# Patient Record
Sex: FEMALE | Race: WHITE | NOT HISPANIC OR LATINO | Employment: STUDENT | URBAN - METROPOLITAN AREA
[De-identification: names, ages, dates, MRNs, and addresses within clinical notes are randomized per-mention and may not be internally consistent; named-entity substitution may affect disease eponyms.]

---
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
## 2023-03-15 DIAGNOSIS — L70.0 ACNE VULGARIS: ICD-10-CM

## 2023-03-22 RX ORDER — NORGESTIMATE AND ETHINYL ESTRADIOL 0.25-0.035
KIT ORAL
Qty: 84 TABLET | Refills: 4 | Status: SHIPPED | OUTPATIENT
Start: 2023-03-22

## 2023-07-12 DIAGNOSIS — L70.0 ACNE VULGARIS: ICD-10-CM

## 2023-07-12 RX ORDER — NORGESTIMATE AND ETHINYL ESTRADIOL 0.25-0.035
KIT ORAL
Qty: 84 TABLET | Refills: 4 | Status: SHIPPED | OUTPATIENT
Start: 2023-07-12

## 2023-08-07 ENCOUNTER — OFFICE VISIT (OUTPATIENT)
Dept: OBGYN CLINIC | Facility: CLINIC | Age: 18
End: 2023-08-07
Payer: COMMERCIAL

## 2023-08-07 VITALS — BODY MASS INDEX: 18.97 KG/M2 | DIASTOLIC BLOOD PRESSURE: 72 MMHG | WEIGHT: 114 LBS | SYSTOLIC BLOOD PRESSURE: 122 MMHG

## 2023-08-07 DIAGNOSIS — Z30.41 ENCOUNTER FOR SURVEILLANCE OF CONTRACEPTIVE PILLS: ICD-10-CM

## 2023-08-07 DIAGNOSIS — L70.0 ACNE VULGARIS: ICD-10-CM

## 2023-08-07 DIAGNOSIS — Z01.419 ENCOUNTER FOR GYNECOLOGICAL EXAMINATION (GENERAL) (ROUTINE) WITHOUT ABNORMAL FINDINGS: Primary | ICD-10-CM

## 2023-08-07 PROCEDURE — S0610 ANNUAL GYNECOLOGICAL EXAMINA: HCPCS | Performed by: STUDENT IN AN ORGANIZED HEALTH CARE EDUCATION/TRAINING PROGRAM

## 2023-08-07 RX ORDER — DESOGESTREL AND ETHINYL ESTRADIOL 0.15-0.03
1 KIT ORAL DAILY
Qty: 84 TABLET | Refills: 4 | Status: SHIPPED | OUTPATIENT
Start: 2023-08-07

## 2023-08-07 RX ORDER — NORGESTIMATE AND ETHINYL ESTRADIOL 0.25-0.035
1 KIT ORAL DAILY
Qty: 84 TABLET | Refills: 4 | Status: SHIPPED | OUTPATIENT
Start: 2023-08-07 | End: 2023-08-07

## 2023-08-07 NOTE — PROGRESS NOTES
Tristen Downing  2005    Assessment and Plan:  Yearly exam without abnormality.     -Pap next annual. We reviewed ASCCP guidelines for Pap testing today. -Recommend STI testing for both herself and her partner. She will do this as soon as she is back at school. Long term sequelae of STIs reviewed today  -Switch to Apri for OCPs, given inadequate treatment of acne with Orthocyclen. Safe and effective use reviewed    RTO one year for yearly exam or sooner as needed. CC:  Yearly exam    S:  19 y.o. female here for yearly exam.     Menarche: 13years old. G0  LMP 7/18/23  Contraception: OCP   - was initially started for acne, but her acne is not actually improved   History of chlamydia, asymptomatic. She and partner were both treated, was not tested for reinfection    No smoking, alcohol, drugs   Exercises irregularly    Her cycles are regular, not heavy or crampy. Sexual activity: She is sexually active without pain, bleeding or dryness. STD testing:  She does not want STD testing today. Does not want this to be seen on her parents' insurance claim. Gardasil:  She has had the Gardasil series. Family hx of breast cancer: denies  Family hx of ovarian cancer: denies  Family hx of colon cancer: denies     Denies hot flushes, dyspareunia, abnormal uterine bleeding, urinary/fecal incontinence, changes in energy levels, mood. Current Outpatient Medications:   •  desogestrel-ethinyl estradiol (APRI) 0.15-30 MG-MCG per tablet, Take 1 tablet by mouth daily, Disp: 84 tablet, Rfl: 4  •  brompheniramine-pseudoephedrine-DM 30-2-10 MG/5ML syrup, Take 10 mL by mouth 4 (four) times a day as needed for congestion or cough (Patient not taking: Reported on 10/3/2022), Disp: 120 mL, Rfl: 0  •  fluticasone (FLONASE) 50 mcg/act nasal spray, 1 spray into each nostril daily.  (Patient not taking: Reported on 8/16/2022), Disp: , Rfl:   Social History     Socioeconomic History   • Marital status: Single Spouse name: Not on file   • Number of children: Not on file   • Years of education: Not on file   • Highest education level: Not on file   Occupational History   • Not on file   Tobacco Use   • Smoking status: Never   • Smokeless tobacco: Never   • Tobacco comments:     No exposure   Vaping Use   • Vaping Use: Never used   Substance and Sexual Activity   • Alcohol use: Not Currently   • Drug use: Never   • Sexual activity: Yes     Partners: Male     Birth control/protection: OCP   Other Topics Concern   • Not on file   Social History Narrative   • Not on file     Social Determinants of Health     Financial Resource Strain: Not on file   Food Insecurity: Not on file   Transportation Needs: Not on file   Physical Activity: Not on file   Stress: Not on file   Social Connections: Not on file   Intimate Partner Violence: Not on file   Housing Stability: Not on file     Family History   Problem Relation Age of Onset   • Hypertension Mother    • No Known Problems Father    • Melanoma Maternal Grandfather    • Breast cancer Neg Hx    • Colon cancer Neg Hx    • Ovarian cancer Neg Hx       Past Medical History:   Diagnosis Date   • Allergic rhinitis         Review of Systems   Respiratory: Negative. Cardiovascular: Negative. Gastrointestinal: Negative for constipation and diarrhea. Genitourinary: Negative for difficulty urinating, pelvic pain, vaginal bleeding, vaginal discharge, itching or odor. O:  Blood pressure 122/72, weight 51.7 kg (114 lb), last menstrual period 07/18/2023.     Patient appears well and is not in distress  Extremities without edema, erythema, movement normal

## 2024-05-17 DIAGNOSIS — L70.0 ACNE VULGARIS: ICD-10-CM

## 2024-05-17 DIAGNOSIS — Z30.41 ENCOUNTER FOR SURVEILLANCE OF CONTRACEPTIVE PILLS: ICD-10-CM

## 2024-05-17 RX ORDER — DESOGESTREL AND ETHINYL ESTRADIOL 0.15-0.03
1 KIT ORAL DAILY
Qty: 84 TABLET | Refills: 1 | Status: SHIPPED | OUTPATIENT
Start: 2024-05-17

## 2024-07-08 ENCOUNTER — APPOINTMENT (OUTPATIENT)
Dept: URGENT CARE | Facility: MEDICAL CENTER | Age: 19
End: 2024-07-08

## 2024-07-08 ENCOUNTER — APPOINTMENT (OUTPATIENT)
Dept: LAB | Facility: MEDICAL CENTER | Age: 19
End: 2024-07-08
Payer: COMMERCIAL

## 2024-09-19 ENCOUNTER — OFFICE VISIT (OUTPATIENT)
Dept: OBGYN CLINIC | Facility: CLINIC | Age: 19
End: 2024-09-19
Payer: COMMERCIAL

## 2024-09-19 VITALS
HEIGHT: 63 IN | WEIGHT: 107.8 LBS | DIASTOLIC BLOOD PRESSURE: 70 MMHG | SYSTOLIC BLOOD PRESSURE: 130 MMHG | BODY MASS INDEX: 19.1 KG/M2

## 2024-09-19 DIAGNOSIS — L70.0 ACNE VULGARIS: ICD-10-CM

## 2024-09-19 DIAGNOSIS — Z01.419 ENCNTR FOR GYN EXAM (GENERAL) (ROUTINE) W/O ABN FINDINGS: Primary | ICD-10-CM

## 2024-09-19 DIAGNOSIS — Z30.41 ENCOUNTER FOR SURVEILLANCE OF CONTRACEPTIVE PILLS: ICD-10-CM

## 2024-09-19 PROCEDURE — G0145 SCR C/V CYTO,THINLAYER,RESCR: HCPCS | Performed by: OBSTETRICS & GYNECOLOGY

## 2024-09-19 PROCEDURE — S0612 ANNUAL GYNECOLOGICAL EXAMINA: HCPCS | Performed by: OBSTETRICS & GYNECOLOGY

## 2024-09-19 RX ORDER — NORETHINDRONE ACETATE AND ETHINYL ESTRADIOL, ETHINYL ESTRADIOL AND FERROUS FUMARATE 1MG-10(24)
1 KIT ORAL DAILY
COMMUNITY
End: 2024-09-19

## 2024-09-19 RX ORDER — DESOGESTREL AND ETHINYL ESTRADIOL 0.15-0.03
1 KIT ORAL DAILY
Qty: 84 TABLET | Refills: 4 | Status: SHIPPED | OUTPATIENT
Start: 2024-09-19

## 2024-09-19 NOTE — PROGRESS NOTES
Reynosoivanna Greenwood  2005    CC:  Yearly exam    S:  19 y.o. female here for yearly exam. This is her first exam.  Discussed expectations.      Her cycles are regular, not heavy or crampy.     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      Sexual activity: She is sexually active without pain, bleeding or dryness.     Contraception:  She uses Apri for contraception.      STD testing:  She does not request STD testing today.      Gardasil:  She has had the Gardasil series.     We reviewed ASC guidelines for Pap testing.     Last Pap - never    Family hx of breast cancer: no  Family hx of ovarian cancer: no  Family hx of colon cancer: no      Current Outpatient Medications:     desogestrel-ethinyl estradiol (Apri) 0.15-30 MG-MCG per tablet, Take 1 tablet by mouth daily, Disp: 84 tablet, Rfl: 4  Social History     Socioeconomic History    Marital status: Single     Spouse name: Not on file    Number of children: Not on file    Years of education: Not on file    Highest education level: Not on file   Occupational History    Not on file   Tobacco Use    Smoking status: Never    Smokeless tobacco: Never    Tobacco comments:     No exposure   Vaping Use    Vaping status: Never Used   Substance and Sexual Activity    Alcohol use: Not Currently    Drug use: Never    Sexual activity: Yes     Partners: Male     Birth control/protection: OCP   Other Topics Concern    Not on file   Social History Narrative    Not on file     Social Determinants of Health     Financial Resource Strain: Not on file   Food Insecurity: Not on file   Transportation Needs: Not on file   Physical Activity: Not on file   Stress: Not on file   Social Connections: Unknown (6/18/2024)    Received from [employer]    Social AroundWire     How often do you feel lonely or isolated from those around you? (Adult - for ages 18 years and over): Not on file   Intimate Partner Violence: Not on file   Housing Stability: Not on  "file     Family History   Problem Relation Age of Onset    Hypertension Mother     No Known Problems Father     Melanoma Maternal Grandfather     Breast cancer Neg Hx     Colon cancer Neg Hx     Ovarian cancer Neg Hx      Past Medical History:   Diagnosis Date    Allergic rhinitis     Chlamydia infection 2021    treated       Review of Systems   Respiratory: Negative.    Cardiovascular: Negative.    Gastrointestinal: Negative for constipation and diarrhea.   Genitourinary: Negative for difficulty urinating, pelvic pain, vaginal bleeding, vaginal discharge, itching or odor.    O:  Blood pressure 130/70, height 5' 3\" (1.6 m), weight 48.9 kg (107 lb 12.8 oz), last menstrual period 09/03/2024.    Patient appears well and is not in distress  Neck is supple without masses  Breasts are symmetrical without mass, tenderness, nipple discharge, skin changes or adenopathy.   Abdomen is soft and nontender without masses.   External genitals are normal without lesions or rashes.  Urethra and urethral meatus are normal  Bladder is normal to palpation  Vagina is normal without discharge or bleeding.   Cervix is normal without discharge or lesion.   Uterus is normal, mobile, nontender without palpable mass.  Adnexa are normal, nontender, without palpable mass.     A:   Yearly exam.     P:   Pap with reflex HPV done - will call with results   BCP refills sent    RTO one year for yearly exam or sooner as needed.     "

## 2024-09-25 LAB
LAB AP GYN PRIMARY INTERPRETATION: NORMAL
LAB AP LMP: NORMAL
Lab: NORMAL

## 2025-03-06 NOTE — PROGRESS NOTES
Virtual Regular Visit    Verification of patient location:    Patient is located in the following state in which I hold an active license [place]      Assessment/Plan:    Problem List Items Addressed This Visit    None     Visit Diagnoses     Acne vulgaris    -  Primary    Encounter for medication monitoring        Xerosis of skin                   Reason for visit is   Chief Complaint   Patient presents with    Virtual Regular Visit        Encounter provider Fallon Castelan MD    Provider located at [address] [place] [address] Drive  [facility]  [address]  [phone]      Recent Visits  No visits were found meeting these conditions  Showing recent visits within past 7 days and meeting all other requirements  Today's Visits  Date Type Provider Dept   10/03/22 Telemedicine Fallon Castelan MD  [facility] [place]   Showing today's visits and meeting all other requirements  Future Appointments  No visits were found meeting these conditions  Showing future appointments within next 150 days and meeting all other requirements       The patient was identified by name and date of birth  Nilay Sheets was informed that this is a telemedicine visit and that the visit is being conducted through [address] [place] [address] Now and patient was informed that this is a secure, HIPAA-compliant platform  She agrees to proceed     My office door was closed  The patient was notified the following individuals were present in the room 2  She acknowledged consent and understanding of privacy and security of the video platform  The patient has agreed to participate and understands they can discontinue the visit at any time  Patient is aware this is a billable service  Maddi Shin is a 21 y o  female Starting Accutane   HPI     Past Medical History:   Diagnosis Date    Allergic rhinitis        History reviewed  No pertinent surgical history      Current Outpatient Medications   Medication Sig Dispense Refill    norgestimate-ethinyl estradiol (ORTHO-CYCLEN) 0 25-35 MG-MCG per tablet TAKE ONE TABLET BY MOUTH DAILY 84 tablet 4    adapalene (DIFFERIN) 0 1 % cream Apply topically daily at bedtime (Patient not taking: No sig reported)      Adapalene 0 3 % gel Spread one pea-sized amount of medication over entire face about one hour before bedtime  (Patient not taking: No sig reported) 45 g 3    brompheniramine-pseudoephedrine-DM 30-2-10 MG/5ML syrup Take 10 mL by mouth 4 (four) times a day as needed for congestion or cough (Patient not taking: Reported on 10/3/2022) 120 mL 0    clindamycin (CLEOCIN T) 1 % external solution Apply topically once a day in the morning (Patient not taking: No sig reported) 60 mL 3    doxycycline hyclate (VIBRAMYCIN) 100 mg capsule Take 100 mg by mouth every 12 (twelve) hours (Patient not taking: No sig reported)      fluticasone (FLONASE) 50 mcg/act nasal spray 1 spray into each nostril daily  (Patient not taking: No sig reported)      tretinoin (RETIN-A) 0 025 % cream Apply topically daily at bedtime Spread one pea-sized amount of medication over entire face about one hour before bedtime  (Patient not taking: Reported on 10/3/2022) 45 g 6     No current facility-administered medications for this visit  Allergies   Allergen Reactions    Penicillins        Review of Systems    Video Exam    There were no vitals filed for this visit      Physical Exam     I spent 25 minutes directly with the patient during this visit    [facility] Follow Up Note    Patient Name: Stephanie Knutson  Encounter Date: 10/03/2022    Today's Chief Concerns:  Anu Brody Concern #1:  Starting Accutane       Current Medications:    Current Outpatient Medications:     norgestimate-ethinyl estradiol (ORTHO-CYCLEN) 0 25-35 MG-MCG per tablet, TAKE ONE TABLET BY MOUTH DAILY, Disp: 84 tablet, Rfl: 4    adapalene (DIFFERIN) 0 1 % cream, Apply topically daily at bedtime (Patient not taking: No sig reported), Disp: , Rfl:     Adapalene 0 3 % gel, Spread one pea-sized amount of medication over entire face about one hour before bedtime  (Patient not taking: No sig reported), Disp: 45 g, Rfl: 3    brompheniramine-pseudoephedrine-DM 30-2-10 MG/5ML syrup, Take 10 mL by mouth 4 (four) times a day as needed for congestion or cough (Patient not taking: Reported on 10/3/2022), Disp: 120 mL, Rfl: 0    clindamycin (CLEOCIN T) 1 % external solution, Apply topically once a day in the morning (Patient not taking: No sig reported), Disp: 60 mL, Rfl: 3    doxycycline hyclate (VIBRAMYCIN) 100 mg capsule, Take 100 mg by mouth every 12 (twelve) hours (Patient not taking: No sig reported), Disp: , Rfl:     fluticasone (FLONASE) 50 mcg/act nasal spray, 1 spray into each nostril daily  (Patient not taking: No sig reported), Disp: , Rfl:     tretinoin (RETIN-A) 0 025 % cream, Apply topically daily at bedtime Spread one pea-sized amount of medication over entire face about one hour before bedtime  (Patient not taking: Reported on 10/3/2022), Disp: 45 g, Rfl: 6    CONSTITUTIONAL:   There were no vitals filed for this visit  Specific Alerts:    Have you been seen by a [facility]ologist in the last 3 years? YES    Are you pregnant or planning to become pregnant? No    Are you currently or planning to be nursing or breast feeding? No    Allergies   Allergen Reactions    Penicillins        May we call your Preferred Phone number to discuss your specific medical information? YES    May we leave a detailed message that includes your specific medical information? YES    Have you traveled outside of the [facility] in the past 3 months? No    Do you currently have a pacemaker or defibrillator? No    Do you have any artificial heart valves, joints, plates, screws, rods, stents, pins, etc? No   - If Yes, were any placed within the last 2 years?     Do you require any medications prior to a surgical procedure? No    Are you taking any medications that cause you to bleed more easily ("blood thinners") No    Have you ever experienced a rapid heartbeat with epinephrine? No    Have you ever been treated with "gold" (gold sodium thiomalate) therapy? No    [facility] can help with wrinkles, "laugh lines," facial volume loss, "double chin," "love handles," age spots, and more  Are you interested in learning today about some of the skin enhancement procedures that we offer? (If Yes, please provide more detail) No    Review of Systems:  Have you recently had or currently have any of the following?     · Fever or chills: No  · Night Sweats: No  · Headaches: No  · Weight Gain: No  · Weight Loss: No  · Blurry Vision: No  · Nausea: No  · Vomiting: No  · Diarrhea: No  · Blood in Stool: No  · Abdominal Pain: No  · Itchy Skin: No  · Painful Joints: No  · Swollen Joints: No  · Muscle Pain: No  · Irregular Mole: No  · Sun Burn: No  · Dry Skin: No  · Skin Color Changes: No  · Scar or Keloid: No  · Cold Sores/Fever Blisters: No  · Bacterial Infections/MRSA: No  · Anxiety: No  · Depression: No  · Suicidal or Homicidal Thoughts: No      PSYCH: Normal mood and affect  EYES: Normal conjunctiva  ENT: Normal lips and oral mucosa  CARDIOVASCULAR: No edema  RESPIRATORY: Normal respirations  HEME/LYMPH/IMMUNO:  No regional lymphadenopathy except as noted below in ASSESSMENT AND PLAN BY DIAGNOSIS    FULL ORGAN SYSTEM SKIN EXAM (SKIN)  Hair, Scalp, Ears, Face Normal except as noted below in Assessment   Neck, Cervical Chain Nodes Normal except as noted below in Assessment   Right Arm/Hand/Fingers Normal except as noted below in Assessment   Left Arm/Hand/Fingers Normal except as noted below in Assessment   Chest/Breasts/Axillae Viewed areas Normal except as noted below in Assessment   Abdomen, Umbilicus Normal except as noted below in Assessment   Back/Spine Normal except as noted below in Assessment Groin/Genitalia/Buttocks Viewed areas Normal except as noted below in Assessment   Right Leg, Foot, Toes Normal except as noted below in Assessment   Left Leg, Foot, Toes Normal except as noted below in Assessment       ISOTRETINOIN "ACCUTANE": FEMALE    Age: 21 y o  Weight (in KILOgrams): 51 3 KG    Ipledge number: UNKNOWN  Last 4 digits SS: [id number]    Contraception Type 1: hormonal combination oral contraceptive pill  Contraception Type 2: male latex condom  Patient reminded that this must be listed in same order on iPledge  Yes       "Goal Dose" in mg (125 mg x weight in kg): 6,650 mg - 7,980 mg    Interim month   "Daily Dose" of Isotretinoin the patient has actually been taking since last visit (this is usually what was prescribed): 0 mg   "Total # of Days" patient took Isotretinoin since last visit (this is usually 30):  0 days   "Total Monthly Dose" in mg since last visit (this equals "Daily Dose" x "Total # of Days"): 0 mg    Cumulative dosage   "Total cumulative Dose" in mg (add the above "Total Monthly Dose" with "Total Cumulative Dose" from last visit: 0 mg    Unfortunately, the patient was never registered in [facility]  Today we will get baseline HCG, start 30 day wait period, and initiate med in 30 days after repeat HCG  She denies a history of depression/anxiety, IBD in self or family, liver disease or hyperlipidemia  Physical Exam   Psychiatric/Mood: Normal    Anatomic Location Affected: Face   Morphological Description: Virtual    Pertinent Negatives:    Labs   Date of last labs: 09/19/2022   Completed: YES   Labs reviewed: within normal limits   Pregnancy test: serum quantitative  - Result: negative  - Interpretation- pregnant: No      Additional History of Present Condition:  Patient is to start Accutane today  Unfortunately, the patient was never registered in [facility]  Today we will get baseline HCG, start 30 day wait period, and initiate med in 30 days after repeat HCG   She denies a history of depression/anxiety, IBD in self or family, liver disease or hyperlipidemia  DIAGNOSES:     Acne Vulgaris   High Risk Medication   Medication Monitoring   Xerosis (see below for patient education)    Assessment and Plan:   Target Total Dose per KILOgram:  125 mg/KILOgram (this may change this on a patient-by-patient basis)   Planned daily dose for next 30 days: Needs to be registered in [address] [place] [address] (please remember to add patient's "Ipledge number" to actual prescription): UNKNOWN    Return to clinic in about 1 month, please review ipledge guidelines in terms of timing (see below for patient education)   Get labs 1-2 days before next appointment: YES   Patient confirmed in iPledge: YES   Patients counseled:  - Cannot donate blood while taking isotretinoin and for 1 month after completing therapy  YES  - Do not share medication with anyone  YES  - Possible side effects discussed, including sun sensitivity, dry lips/eyes/skin, headaches, blurry vision (pseudotumor cerebri), muscle/joint aches, GI upset, bloody stools (IBD including Crohns/Ulcerative Colitis), jaundice, liver dysfunction, lipid abnormalities, bone marrow dysfunction, mood effects, thoughts of hurting oneself or others, and flaring of acne (acne fulminans/SAPPHO)  YES  - Females cannot get pregnant and must be on two forms of birth control while on therapy and at least one month after  YES  - Report any side effects of mood swings or depression and stop medication upon occurrence  YES  - Patient needs to confirm counseling in [facility] prior to picking up prescription  YES      Isotretinoin for Acne   Isotretinoin, a derivative of vitamin A, is a retinoid medication that is taken by mouth to treat severe nodular acne  Typically, it is used once other acne treatments have not worked, such as oral antibiotics  Isotretinoin is powerful drug with several significant potential side effects   It used to be sold under the brand name Accutane but now several versions exist  Usually isotretinoin is taken for 4 to 6 months, although the length of treatment can vary from person to person  While most patients acne improves and may even clear with this medication, in 20% of patients acne can come back  This requires additional acne treatment or even a second cycle of isotretinoin  How should I take isotretinoin?  Isotretinoin dosing is weight-based and should be taken exactly as prescribed   If you miss a dose, skip that dose  Do not take 2 doses at the same time   Take with food to help with absorption   All instructions in the [employer] program packet (www [employer]) that was provided must be followed (see below for highlights)   You will get a 30 day supply of isotretinoin at a time  It cannot be automatically refilled  Make certain that you have been given enough medication to last 30 days as pharmacists are unable to refill or make changes within a month   You must return to your dermatologist every month to make sure you are not having any serious side effects from isotretinoin  For female patients, this visit will always include a monthly pregnancy test  Other laboratory studies, including liver function tests, cholesterol and triglycerides, must also be conducted before and during treatment  What should I avoid while taking isotretinoin?  Do not get pregnant from one month prior or 1 month following taking any isotretinoin   Do not donate blood while take isotretinoin or until 1 months after coming off the medication   Do not have cosmetic procedures to smooth your skin, including waxing, dermabrasion, or laser procedures, while taking this medication and for at least 6 months after you stop   Do not share isotretinoin with any other people  It can cause birth defects and other serious health problems      Do not use any other acne medications, including medicated washes, cleansers, creams or antibiotic pills during your treatment with isotretinoin unless expressly directed to by your dermatologist      Initiating isotretinoin & the [facility] Program is a strict, government-required program to prevent females from becoming pregnant while on isotretinoin  All females and males must participate  Note: Your provider must follow this program and cannot change any of the requirements   Before starting isotretinoin, your provider will talk to you about the safe use of this medication and you will need to sign consent forms in order to receive treatment   If you fail to keep appointments, you will be unable to get your prescription filled  Nadiya Chapman For females of childbearing age:      o Luis Ridfrantz must be on two specific forms of birth control before starting isotretinoin  Your provider must get 2 negative pregnancy tests, at least 30 days apart, before you can proceed with the medication  The second pregnancy test must be obtained within 5 days of the menstrual cycle  If you choose not to be sexually active during treatment, you still must have the 2 negative pregnancy tests    o You must answer a series of questions either online or by phone every month prior to getting the prescription  o Monthly prescriptions must be filled within 7 days of that month's pregnancy test   It is important that you notify your physician well before the 7th day if there are any unforeseen delays, such as prior authorizations  o For more information, visit: [email or web address]    What are the possible side effects of isotretinoin? What should I do? Most side effects from isotretinoin are mild and can be easily improved with simple remedies  Others are more concerning   Dry skin and eyes, chapped lips and dry nose that may lead to nosebleeds  o Dry Skin: Apply sensitive skin moisturizers to dry skin at least two times a day   You may need sunscreen (SPF 27) in the morning and to reapply when outside  o Dry Eyes: Use saline eye drops or artificial tears  o Dry Nose/Nosebleeds: Use saline nasal spray (ex  Ayr) during the day and at bedtime  To stop nosebleeds, hold pressure  If this does not work, call your dermatologist     o Chapped lips: apply petrolatum-based lip balms routinely  Avoid anything medicated   Contact your dermatologist for excessive dryness, cracks, tenderness or pain   Increased blood fats and cholesterol (usually in patients with a personal or family history of cholesterol or triglyceride problems)   Vision problems affecting your ability to see in the dark and drive at night   Bone, muscle and tendon aches can occur  Additional stretching before and after activities may help relieve aches  If you are otherwise healthy, consider the use of ibuprofen or naproxen  If you are unsure if you can use these pain medications, ask your doctor first  Also, call your doctor if you experience severe back pain, joint pain, or a broken bone    Changes in mood, including anxiety, depressive symptoms or suicidal thoughts which may or may not be temporary  Notify your doctor if your or a family member have suffered from these conditions or if you have any concerns during treatment   Serious birth defects or miscarriage can occur while taking this medication and for one month after taking your last dose of isotretinoin  You must not get pregnant while taking isotretinoin  Once the medication is out of your system, 30 days, there is no effect on the baby   Increased pressure in the brain  Call your doctor right away if you experience bad headache, blurred vision, dizziness, nausea or vomiting, or seizures   Skin rash - call your doctor right away if you develop any rashes or blisters on the skin      Liver damage - call your doctor right away if you experience severe stomach, chest or bowel pain, painful swallowing, diarrhea, blood in your stool, yellowing of your skin or eyes, or dark urine   Gastrointestinal bleeding  If you experience unusual abdominal pain or red or black/tarry stools, call your doctor immediately  You should also notify your doctor if you or a family member has a history of ulcerative colitis or Crohns disease   Worsening acne  Mild worsening of acne can occur in the first few weeks of using isotreinoin  If your acne is getting significantly worse, call your doctor  This may require temporarily stopping the isotretinoin and possibly adding other medications  XEROSIS ("DRY SKIN")    Dry skin refers to skin that feels dry to touch  Dry skin has a dull surface with a rough, scaly quality  The skin is less pliable and cracked  When dryness is severe, the skin may become inflamed and fissured  Although any body site can be dry, dry skin tends to affect the shins more than any other site  Dry skin is lacking moisture in the outer horny cell layer (stratum corneum) and this results in cracks in the skin surface  Dry skin is also called xerosis, xeroderma or asteatosis (lack of fat)  It can affect males and females of all ages  There is some racial variability in water and lipid content of the skin   Dry skin that starts in early childhood may be one of about 20 types of ichthyosis (fish-scale skin)  There is often a family history of dry skin   Dry skin is commonly seen in people with atopic dermatitis   Nearly everyone > 60 years has dry skin  Dry skin that begins later may be seen in people with certain diseases and conditions   Postmenopausal women   Hypothyroidism   Chronic renal disease    Malnutrition and weight loss    Subclinical dermatitis    Treatment with certain drugs such as oral retinoids, diuretics and epidermal growth factor receptor inhibitors    People exposed to a dry environment may experience dry skin     Low humidity: in desert climates or cool, windy conditions    Excessive air conditioning    Direct heat from a fire or fan heater    Excessive bathing    Contact with soap, detergents and solvents    Inappropriate topical agents such as alcohol    Frictional irritation from rough clothing or abrasives    Dry skin is due to abnormalities in the integrity of the barrier function of the stratum corneum, which is made up of corneocytes   There is an overall reduction in the lipids in the stratum corneum   Ratio of ceramides, cholesterol and free fatty acids may be normal or altered   There may be a reduction in the proliferation of keratinocytes   Keratinocyte subtypes change in dry skin with a decrease in keratins K1, K10 and increase in K5, K14     Involucrin (a protein) may be expressed early, increasing cell stiffness   The result is retention of corneocytes and reduced water-holding capacity  What is the treatment for dry skin? The mainstay of treatment of dry skin and ichthyosis is moisturisers/emollients  They should be applied liberally and often enough to:   Reduce itch    Improve the barrier function    Prevent entry of irritants, bacteria    Reduce transepidermal water loss  When considering which emollient is most suitable, consider:   Severity of the dryness    Tolerance    Personal preference    Cost and availability  Emollients generally work best if applied to damp skin, if pH is below 7 (acidic), and if containing humectants such as urea or propylene glycol  Additional treatments include:   Topical steroid if itchy or there is dermatitis -- choose an emollient base    Topical calcineurin inhibitors if topical steroids are unsuitable  How can dry skin be prevented? Eliminate aggravating factors:   Reduce the frequency of bathing   A humidifier in winter and air conditioner in summer    Compare having a short shower with a prolonged soak in a bath   Use lukewarm, not hot, water      Replace standard soap with a substitute such as a synthetic detergent cleanser, water-miscible emollient, bath oil, anti-pruritic tar oil, colloidal oatmeal etc     Apply an emollient liberally and often, particularly shortly after bathing, and when itchy  The drier the skin, the thicker this should be, especially on the hands  What is the outlook for dry skin? A tendency to dry skin may persist life-long, or it may improve once contributing factors are controlled          Scribe Attestation    I,:  Justin Wyatt am acting as a scribe while in the presence of the attending physician :       I,:  Sadia Lopez MD personally performed the services described in this documentation    as scribed in my presence : 36.7

## 2025-06-05 ENCOUNTER — OFFICE VISIT (OUTPATIENT)
Dept: DERMATOLOGY | Facility: CLINIC | Age: 20
End: 2025-06-05

## 2025-06-05 VITALS — BODY MASS INDEX: 18.95 KG/M2 | WEIGHT: 107 LBS

## 2025-06-05 DIAGNOSIS — D48.5 NEOPLASM OF UNCERTAIN BEHAVIOR OF SKIN: ICD-10-CM

## 2025-06-05 DIAGNOSIS — D22.9 MULTIPLE BENIGN MELANOCYTIC NEVI: Primary | ICD-10-CM

## 2025-06-05 PROCEDURE — 88305 TISSUE EXAM BY PATHOLOGIST: CPT | Performed by: DERMATOLOGY

## 2025-06-05 PROCEDURE — 88342 IMHCHEM/IMCYTCHM 1ST ANTB: CPT | Performed by: DERMATOLOGY

## 2025-06-05 PROCEDURE — 88341 IMHCHEM/IMCYTCHM EA ADD ANTB: CPT | Performed by: DERMATOLOGY

## 2025-06-05 NOTE — PROGRESS NOTES
"[facility] Dermatology Clinic Note     Patient Name: Angeles Greenwood  Encounter Date: 6/5/25       Have you been cared for by a [facility] Dermatologist in the last 3 years and, if so, which description applies to you? Yes. I have been here within the last 3 years, and my medical history has NOT changed since that time. I am of child-bearing potential.     REVIEW OF SYSTEMS:  Have you recently had or currently have any of the following? No changes in my recent health.   PAST MEDICAL HISTORY:  Have you personally ever had or currently have any of the following?  If \"YES,\" then please provide more detail. No changes in my medical history.   HISTORY OF IMMUNOSUPPRESSION: Do you have a history of any of the following:  Systemic Immunosuppression such as Diabetes, Biologic or Immunotherapy, Chemotherapy, Organ Transplantation, Bone Marrow Transplantation or Prednisone?  No     Answering \"YES\" requires the addition of the dotphrase \"IMMUNOSUPPRESSED\" as the first diagnosis of the patient's visit.   FAMILY HISTORY:  Any \"first degree relatives\" (parent, brother, sister, or child) with the following?    No changes in my family's known health.   PATIENT EXPERIENCE:    Do you want the Dermatologist to perform a COMPLETE skin exam today including a clinical examination under the \"bra and underwear\" areas?  Yes  If necessary, do we have your permission to call and leave a detailed message on your Preferred Phone number that includes your specific medical information?  Yes      Allergies[1] Current Medications[2]              Whom besides the patient is providing clinical information about today's encounter?   NO ADDITIONAL HISTORIAN (patient alone provided history)    Physical Exam and Assessment/Plan by Diagnosis:  NEOPLASM OF UNCERTAIN BEHAVIOR OF SKIN    Physical Exam:  (Anatomic Location); (Size and Morphological Description); (Differential Diagnosis):  Specimen A: central mid back; 8 mm irregularly pigmented papule. " "Differential diagnosis: benign vs atypical nevus; rule out melanoma   Specimen B: right frontal scalp; 4 mm irregularly pigmented papule. Differential diagnosis: benign vs atypical nevus; rule out melanoma   Pertinent Positives:  Pertinent Negatives:    Additional History of Present Condition:  Scalp lesion is new and growing larger. Strong family history of melanoma.    Assessment and Plan:  I have discussed with the patient that a sample of skin via a \"skin biopsy” would be potentially helpful to further make a specific diagnosis under the microscope.  Based on a thorough discussion of this condition and the management approach to it (including a comprehensive discussion of the known risks, side effects and potential benefits of treatment), the patient (family) agrees to implement the following specific plan:    Procedure:  Skin Biopsy.  After a thorough discussion of treatment options and risk/benefits/alternatives (including but not limited to local pain, scarring, dyspigmentation, blistering, possible superinfection, and inability to confirm a diagnosis via histopathology), verbal and written consent were obtained and portion of the rash was biopsied for tissue sample.  See below for consent that was obtained from patient and subsequent Procedure Note.    PROCEDURE TANGENTIAL (SHAVE) BIOPSY NOTE:    Performing Physician:   Anatomic Location; Clinical Description with size (cm); Pre-Op Diagnosis:   Specimen A: central mid back; 8 mm irregularly pigmented papule. Differential diagnosis: benign vs atypical nevus; rule out melanoma   Specimen B: right frontal scalp; 4 mm irregularly pigmented papule. Differential diagnosis: benign vs atypical nevus; rule out melanoma   Post-op diagnosis: Same     Local anesthesia: 1% xylocaine with epi      Topical anesthesia: None    Hemostasis: Aluminum chloride       After obtaining informed consent  at which time there was a discussion about the purpose of biopsy  and " "low risks of infection and bleeding.  The area was prepped and draped in the usual fashion. Anesthesia was obtained with 1% lidocaine with epinephrine. A shave biopsy to an appropriate sampling depth was obtained by Shave (Dermablade or 15 blade) The resulting wound was covered with surgical ointment and bandaged appropriately.     The patient tolerated the procedure well without complications and was without signs of functional compromise.      Specimen has been sent for review by Dermatopathology.    Standard post-procedure care has been explained and has been included in written form within the patient's copy of Informed Consent.    INFORMED CONSENT DISCUSSION AND POST-OPERATIVE INSTRUCTIONS FOR PATIENT    I.  RATIONALE FOR PROCEDURE  I understand that a skin biopsy allows the Dermatologist to test a lesion or rash under the microscope to obtain a diagnosis.  It usually involves numbing the area with numbing medication and removing a small piece of skin; sometimes the area will be closed with sutures. In this specific procedure, sutures are not usually needed.  If any sutures are placed, then they are usually need to be removed in 2 weeks or less.    I understand that my Dermatologist recommends that a skin \"shave\" biopsy be performed today.  A local anesthetic, similar to the kind that a dentist uses when filling a cavity, will be injected with a very small needle into the skin area to be sampled.  The injected skin and tissue underneath \"will go to sleep” and become numb so no pain should be felt afterwards.  An instrument shaped like a tiny \"razor blade\" (shave biopsy instrument) will be used to cut a small piece of tissue and skin from the area so that a sample of tissue can be taken and examined more closely under the microscope.  A slight amount of bleeding will occur, but it will be stopped with direct pressure and a pressure bandage and any other appropriate methods.  I understands that a scar will form " "where the wound was created.  Surgical ointment will be applied to help protect the wound.  Sutures are not usually needed.    II.  RISKS AND POTENTIAL COMPLICATIONS   I understand the risks and potential complications of a skin biopsy include but are not limited to the following:  Bleeding  Infection  Pain  Scar/keloid  Skin discoloration  Incomplete Removal  Recurrence  Nerve Damage/Numbness/Loss of Function  Allergic Reaction to Anesthesia  Biopsies are diagnostic procedures and based on findings additional treatment or evaluation may be required  Loss or destruction of specimen resulting in no additional findings    My Dermatologist has explained to me the nature of the condition, the nature of the procedure, and the benefits to be reasonably expected compared with alternative approaches.  My Dermatologist has discussed the likelihood of major risks or complications of this procedure including the specific risks listed above, such as bleeding, infection, and scarring/keloid.  I understand that a scar is expected after this procedure.  I understand that my physician cannot predict if the scar will form a \"keloid,\" which extends beyond the borders of the wound that is created.  A keloid is a thick, painful, and bumpy scar.  A keloid can be difficult to treat, as it does not always respond well to therapy, which includes injecting cortisone directly into the keloid every few weeks.  While this usually reduces the pain and size of the scar, it does not eliminate it.      I understand that photographs may be taken before and after the procedure.  These will be maintained as part of the medical providers confidential records and may not be made available to me.  I further authorize the medical provider to use the photographs for teaching purposes or to illustrate scientific papers, books, or lectures if in his/her judgment, medical research, education, or science may benefit from its use.    I have had an opportunity " "to fully inquire about the risks and benefits of this procedure and its alternatives.   I have been given ample time and opportunity to ask questions and to seek a second opinion if I wished to do so.  I acknowledge that there have specifically been no guarantees as to the cosmetic results from the procedure.  I am aware that with any procedure there is always the possibility of an unexpected complication.    III. POST-PROCEDURAL CARE (WHAT YOU WILL NEED TO DO \"AFTER THE BIOPSY\" TO OPTIMIZE HEALING)    Keep the area clean and dry.  Try NOT to remove the bandage or get it wet for the first 24 hours.    Gently clean the area and apply surgical ointment (such as Vaseline petrolatum ointment, which is available \"over the counter\" and not a prescription) to the biopsy site for up to 2 weeks straight.  This acts to protect the wound from the outside world.      Sutures are not usually placed in this procedure.  If any sutures were placed, return for suture removal as instructed (generally 1 week for the face, 2 weeks for the body).      Take Acetaminophen (Tylenol) for discomfort, if no contraindications.  Ibuprofen or aspirin could make bleeding worse.    Call our office immediately for signs of infection: fever, chills, increased redness, warmth, tenderness, discomfort/pain, or pus or foul smell coming from the wound.    WHAT TO DO IF THERE IS ANY BLEEDING?  If a small amount of bleeding is noticed, place a clean cloth over the area and apply firm pressure for ten minutes.  Check the wound after 10 minutes of direct pressure.  If bleeding persists, try one more time for an additional 10 minutes of direct pressure on the area.  If the bleeding becomes heavier or does not stop after the second attempt, or if you have any other questions about this procedure, then please call your [facility] by calling [phone] (SKIN).     I hereby acknowledge that I have reviewed and verified the site with my " Dermatologist and have requested and authorized my Dermatologist to proceed with the procedure.    MELANOCYTIC NEVI  -Relevant exam: Scattered over the trunk/extremities are homogenously pigmented brown macules and papules. ELM performed and without concerning findings. No outliers unless otherwise noted in today's note  - Exam and clinical history consistent with melanocytic nevi  - Counseled to return to clinic prior to scheduled appointment should any of these lesions change or should any new lesions of concern arise  - Counseled on use of sun protection daily. Reviewed latest FDA sunscreen guidelines, including use of broad spectrum (UVA and UVB blocking) sunscreen or sun protective clothing with SPF 30-50 every 2-3 hours and reapplied after exposure to water    Scribe Attestation      I,:  Dunia Claire MA am acting as a scribe while in the presence of the attending physician.:       I,:  Shawanda Cervantes MD personally performed the services described in this documentation    as scribed in my presence.:                  [1]   Allergies  Allergen Reactions    Penicillins    [2]   Current Outpatient Medications:     desogestrel-ethinyl estradiol (Apri) 0.15-30 MG-MCG per tablet, Take 1 tablet by mouth daily, Disp: 84 tablet, Rfl: 4

## 2025-06-16 ENCOUNTER — RESULTS FOLLOW-UP (OUTPATIENT)
Dept: DERMATOLOGY | Facility: CLINIC | Age: 20
End: 2025-06-16

## 2025-06-16 PROCEDURE — 88342 IMHCHEM/IMCYTCHM 1ST ANTB: CPT | Performed by: DERMATOLOGY

## 2025-06-16 PROCEDURE — 88305 TISSUE EXAM BY PATHOLOGIST: CPT | Performed by: DERMATOLOGY

## 2025-06-16 PROCEDURE — 88341 IMHCHEM/IMCYTCHM EA ADD ANTB: CPT | Performed by: DERMATOLOGY

## 2025-06-18 NOTE — RESULT ENCOUNTER NOTE
DERMATOPATHOLOGY RESULT NOTE    Results reviewed by ordering physician.  Called patient to personally discuss results. Discussed results with patient.       Instructions for Clinical Derm Team:   (remember to route Result Note to appropriate staff):    Call patient and schedule for excision of site A    Result & Plan by Specimen:    Specimen A: indeterminate- mod to severe atypical nevus  Plan: excision      Specimen B: benign mild to mod atypical nevus  Plan: monitor, reassured, benign, and discussed possibility of recurrence    Discussed risk factor for melanoma in lifetime and need for annual skin exam.s      View Follow-Up Encounter      Component  Ref Range & Units (hover)    Case Report   Surgical Pathology Report                         Case: [id number]                                   Authorizing Provider:  Shawanda Cervantes MD     Collected:           06/05/2025 1654               Ordering Location:     [facility]      Received:            06/05/2025 [address] [place] [address]                                                                 Pathologist:           Shawanda Cervantes MD                                                       Specimens:   A) - Skin, Other, Specimen A: central mid back                                                      B) - Skin, Other, Specimen A: right frontal scalp                                          Final Diagnosis   A. 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     Note: SOX10 and MART1 immunohistochemical stains were performed, and fail to demonstrate significant pagetoid scatter or confluent junctional growth of melanocytes. P16 expression is retained, and HMB45 demonstrates top-heavy gradient staining consistent with normal melanocyte maturation with dermal descent. PRAME is negative.     Complete  re-excision of the lesion is recommended.     Representative sections have been reviewed by one or more pathologists as an intradepartmental consultation.               B. 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      Note: SOX10 and MART1 immunohistochemical stains were performed, and fail to demonstrate significant pagetoid scatter or confluent junctional growth of melanocytes. P16 expression is retained, and HMB45 demonstrates top-heavy gradient staining consistent with normal melanocyte maturation with dermal descent. PRAME is negative. Some of the lesion's atypia may be due to its special site location on the scalp.      Electronically signed by Shawanda Cervantes MD on 6/16/2025 at 0941 EDT

## 2025-07-24 DIAGNOSIS — Z30.41 ENCOUNTER FOR SURVEILLANCE OF CONTRACEPTIVE PILLS: ICD-10-CM

## 2025-07-24 DIAGNOSIS — L70.0 ACNE VULGARIS: ICD-10-CM

## 2025-07-25 RX ORDER — DESOGESTREL AND ETHINYL ESTRADIOL 0.15-0.03
1 KIT ORAL DAILY
Qty: 84 TABLET | Refills: 1 | Status: SHIPPED | OUTPATIENT
Start: 2025-07-25

## 2025-08-06 ENCOUNTER — HOSPITAL ENCOUNTER (OUTPATIENT)
Dept: MRI IMAGING | Facility: HOSPITAL | Age: 20
Discharge: HOME | End: 2025-08-06
Attending: INTERNAL MEDICINE

## 2025-08-06 DIAGNOSIS — H47.11 PAPILLEDEMA ASSOCIATED WITH INCREASED INTRACRANIAL PRESSURE: ICD-10-CM

## 2025-08-07 ENCOUNTER — PROCEDURE VISIT (OUTPATIENT)
Dept: DERMATOLOGY | Facility: CLINIC | Age: 20
End: 2025-08-07

## 2025-08-07 VITALS
WEIGHT: 111 LBS | BODY MASS INDEX: 19.66 KG/M2 | DIASTOLIC BLOOD PRESSURE: 78 MMHG | SYSTOLIC BLOOD PRESSURE: 126 MMHG | TEMPERATURE: 97.5 F

## 2025-08-07 DIAGNOSIS — D22.9 ATYPICAL NEVUS: Primary | ICD-10-CM

## 2025-08-18 ENCOUNTER — CLINICAL SUPPORT (OUTPATIENT)
Dept: DERMATOLOGY | Facility: CLINIC | Age: 20
End: 2025-08-18

## 2025-08-18 DIAGNOSIS — Z48.02 VISIT FOR SUTURE REMOVAL: Primary | ICD-10-CM

## 2025-08-18 PROCEDURE — RECHECK: Performed by: REGISTERED NURSE
